# Patient Record
Sex: FEMALE | Race: WHITE | NOT HISPANIC OR LATINO | Employment: OTHER | URBAN - METROPOLITAN AREA
[De-identification: names, ages, dates, MRNs, and addresses within clinical notes are randomized per-mention and may not be internally consistent; named-entity substitution may affect disease eponyms.]

---

## 2018-05-25 ENCOUNTER — HOSPITAL ENCOUNTER (EMERGENCY)
Facility: HOSPITAL | Age: 28
Discharge: HOME | End: 2018-05-25
Attending: EMERGENCY MEDICINE
Payer: COMMERCIAL

## 2018-05-25 VITALS
SYSTOLIC BLOOD PRESSURE: 129 MMHG | HEIGHT: 63 IN | DIASTOLIC BLOOD PRESSURE: 79 MMHG | WEIGHT: 130 LBS | HEART RATE: 56 BPM | OXYGEN SATURATION: 100 % | RESPIRATION RATE: 18 BRPM | TEMPERATURE: 98 F | BODY MASS INDEX: 23.04 KG/M2

## 2018-05-25 DIAGNOSIS — R10.2 PELVIC PAIN: Primary | ICD-10-CM

## 2018-05-25 LAB
ALBUMIN SERPL BCP-MCNC: 3.8 G/DL
ALP SERPL-CCNC: 24 U/L
ALT SERPL W/O P-5'-P-CCNC: 17 U/L
ANION GAP SERPL CALC-SCNC: 7 MMOL/L
AST SERPL-CCNC: 21 U/L
B-HCG UR QL: NEGATIVE
BACTERIA GENITAL QL WET PREP: ABNORMAL
BASOPHILS # BLD AUTO: 0.02 K/UL
BASOPHILS NFR BLD: 0.4 %
BILIRUB SERPL-MCNC: 0.4 MG/DL
BILIRUB UR QL STRIP: NEGATIVE
BUN SERPL-MCNC: 11 MG/DL
CALCIUM SERPL-MCNC: 9.4 MG/DL
CHLORIDE SERPL-SCNC: 104 MMOL/L
CLARITY UR: CLEAR
CLUE CELLS VAG QL WET PREP: ABNORMAL
CO2 SERPL-SCNC: 26 MMOL/L
COLOR UR: YELLOW
CREAT SERPL-MCNC: 0.9 MG/DL
CTP QC/QA: YES
DIFFERENTIAL METHOD: ABNORMAL
EOSINOPHIL # BLD AUTO: 0.2 K/UL
EOSINOPHIL NFR BLD: 2.9 %
ERYTHROCYTE [DISTWIDTH] IN BLOOD BY AUTOMATED COUNT: 12.9 %
EST. GFR  (AFRICAN AMERICAN): >60 ML/MIN/1.73 M^2
EST. GFR  (NON AFRICAN AMERICAN): >60 ML/MIN/1.73 M^2
FILAMENT FUNGI VAG WET PREP-#/AREA: ABNORMAL
GLUCOSE SERPL-MCNC: 110 MG/DL
GLUCOSE UR QL STRIP: NEGATIVE
HCT VFR BLD AUTO: 35.9 %
HGB BLD-MCNC: 11.8 G/DL
HGB UR QL STRIP: NEGATIVE
KETONES UR QL STRIP: NEGATIVE
LEUKOCYTE ESTERASE UR QL STRIP: NEGATIVE
LIPASE SERPL-CCNC: 25 U/L
LYMPHOCYTES # BLD AUTO: 2.6 K/UL
LYMPHOCYTES NFR BLD: 49.8 %
MCH RBC QN AUTO: 29.9 PG
MCHC RBC AUTO-ENTMCNC: 32.9 G/DL
MCV RBC AUTO: 91 FL
MONOCYTES # BLD AUTO: 0.5 K/UL
MONOCYTES NFR BLD: 9 %
NEUTROPHILS # BLD AUTO: 2 K/UL
NEUTROPHILS NFR BLD: 37.7 %
NITRITE UR QL STRIP: NEGATIVE
PH UR STRIP: 7 [PH] (ref 5–8)
PLATELET # BLD AUTO: 282 K/UL
PMV BLD AUTO: 9.8 FL
POTASSIUM SERPL-SCNC: 3.9 MMOL/L
PROT SERPL-MCNC: 7.2 G/DL
PROT UR QL STRIP: NEGATIVE
RBC # BLD AUTO: 3.95 M/UL
SODIUM SERPL-SCNC: 137 MMOL/L
SP GR UR STRIP: 1.02 (ref 1–1.03)
SPECIMEN SOURCE: ABNORMAL
T VAGINALIS GENITAL QL WET PREP: ABNORMAL
URN SPEC COLLECT METH UR: NORMAL
UROBILINOGEN UR STRIP-ACNC: NEGATIVE EU/DL
WBC # BLD AUTO: 5.22 K/UL
WBC #/AREA VAG WET PREP: ABNORMAL
YEAST GENITAL QL WET PREP: ABNORMAL

## 2018-05-25 PROCEDURE — 63600175 PHARM REV CODE 636 W HCPCS: Performed by: PHYSICIAN ASSISTANT

## 2018-05-25 PROCEDURE — 81025 URINE PREGNANCY TEST: CPT | Performed by: NURSE PRACTITIONER

## 2018-05-25 PROCEDURE — 25500020 PHARM REV CODE 255: Performed by: EMERGENCY MEDICINE

## 2018-05-25 PROCEDURE — 81003 URINALYSIS AUTO W/O SCOPE: CPT

## 2018-05-25 PROCEDURE — 87210 SMEAR WET MOUNT SALINE/INK: CPT

## 2018-05-25 PROCEDURE — 85025 COMPLETE CBC W/AUTO DIFF WBC: CPT

## 2018-05-25 PROCEDURE — 25000003 PHARM REV CODE 250

## 2018-05-25 PROCEDURE — 83690 ASSAY OF LIPASE: CPT

## 2018-05-25 PROCEDURE — 96372 THER/PROPH/DIAG INJ SC/IM: CPT

## 2018-05-25 PROCEDURE — 25000003 PHARM REV CODE 250: Performed by: PHYSICIAN ASSISTANT

## 2018-05-25 PROCEDURE — 80053 COMPREHEN METABOLIC PANEL: CPT

## 2018-05-25 PROCEDURE — 99284 EMERGENCY DEPT VISIT MOD MDM: CPT | Mod: 25

## 2018-05-25 PROCEDURE — 96360 HYDRATION IV INFUSION INIT: CPT

## 2018-05-25 PROCEDURE — 87491 CHLMYD TRACH DNA AMP PROBE: CPT

## 2018-05-25 PROCEDURE — 25000003 PHARM REV CODE 250: Performed by: NURSE PRACTITIONER

## 2018-05-25 RX ORDER — KETOROLAC TROMETHAMINE 10 MG/1
10 TABLET, FILM COATED ORAL
Status: COMPLETED | OUTPATIENT
Start: 2018-05-25 | End: 2018-05-25

## 2018-05-25 RX ORDER — MORPHINE SULFATE 4 MG/ML
4 INJECTION, SOLUTION INTRAMUSCULAR; INTRAVENOUS
Status: DISCONTINUED | OUTPATIENT
Start: 2018-05-25 | End: 2018-05-25 | Stop reason: HOSPADM

## 2018-05-25 RX ORDER — AZITHROMYCIN 250 MG/1
1000 TABLET, FILM COATED ORAL
Status: COMPLETED | OUTPATIENT
Start: 2018-05-25 | End: 2018-05-25

## 2018-05-25 RX ORDER — ONDANSETRON 4 MG/1
4 TABLET, ORALLY DISINTEGRATING ORAL
Status: COMPLETED | OUTPATIENT
Start: 2018-05-25 | End: 2018-05-25

## 2018-05-25 RX ORDER — CEFTRIAXONE 250 MG/1
250 INJECTION, POWDER, FOR SOLUTION INTRAMUSCULAR; INTRAVENOUS
Status: COMPLETED | OUTPATIENT
Start: 2018-05-25 | End: 2018-05-25

## 2018-05-25 RX ORDER — LIDOCAINE HYDROCHLORIDE 10 MG/ML
INJECTION INFILTRATION; PERINEURAL
Status: COMPLETED
Start: 2018-05-25 | End: 2018-05-25

## 2018-05-25 RX ORDER — NAPROXEN 500 MG/1
500 TABLET ORAL 2 TIMES DAILY WITH MEALS
Qty: 14 TABLET | Refills: 0 | Status: SHIPPED | OUTPATIENT
Start: 2018-05-25 | End: 2018-08-21

## 2018-05-25 RX ADMIN — SODIUM CHLORIDE 1000 ML: 0.9 INJECTION, SOLUTION INTRAVENOUS at 06:05

## 2018-05-25 RX ADMIN — LIDOCAINE HYDROCHLORIDE 200 MG: 10 INJECTION, SOLUTION INFILTRATION; PERINEURAL at 09:05

## 2018-05-25 RX ADMIN — KETOROLAC TROMETHAMINE 10 MG: 10 TABLET, FILM COATED ORAL at 09:05

## 2018-05-25 RX ADMIN — ONDANSETRON 4 MG: 4 TABLET, ORALLY DISINTEGRATING ORAL at 06:05

## 2018-05-25 RX ADMIN — IOHEXOL 75 ML: 350 INJECTION, SOLUTION INTRAVENOUS at 06:05

## 2018-05-25 RX ADMIN — AZITHROMYCIN 1000 MG: 250 TABLET, FILM COATED ORAL at 09:05

## 2018-05-25 RX ADMIN — CEFTRIAXONE SODIUM 250 MG: 250 INJECTION, POWDER, FOR SOLUTION INTRAMUSCULAR; INTRAVENOUS at 09:05

## 2018-05-25 NOTE — ED PROVIDER NOTES
"Encounter Date: 5/25/2018       History     Chief Complaint   Patient presents with    Abdominal Pain     Pt started having lower abdominal pain x 3 days ago, +nausea and vomiting on 1st day, denies vaginal bleeding or discharge. LBM today and was painful to bear down.      26yo female, previously healthy, presents to the ED for abd pain.  Pt has had lower abd pain which has progressively worsened over the past three days.  No fever/chills, CP, SOB, urinary symptoms, vaginal bleeding, or discharge.  Pt has had nausea and one episode of nonbloody diarrhea today.  Pt states the pain is worsened by movement, certain positions, and BMs.  She has had decreased appetite but has been able to tolerate Po fluids.  Pain is constant, "pressure."  No history of ovarian cysts.  No history of abd surgery.  No other complaints at this time.        The history is provided by the patient.   Abdominal Pain   The current episode started several days ago. The onset of the illness was gradual. The problem has been gradually worsening. The abdominal pain is located in the suprapubic region. The abdominal pain does not radiate. The severity of the abdominal pain is 8/10. The abdominal pain is relieved by nothing. The abdominal pain is exacerbated by certain positions, movement and bowel movements. The other symptoms of the illness include nausea and diarrhea. The other symptoms of the illness do not include fever, vomiting, dysuria, hematemesis, hematochezia, vaginal discharge or vaginal bleeding.   The diarrhea began today. The diarrhea is watery. Daily occurrences: once.   Nausea began yesterday. The nausea is associated with eating. The nausea is exacerbated by food.   The patient states that she believes she is currently not pregnant. The patient has had a change in bowel habit. Additional symptoms associated with the illness include anorexia. Symptoms associated with the illness do not include chills, diaphoresis, heartburn, " constipation, urgency, hematuria, frequency or back pain. Significant associated medical issues do not include GERD or inflammatory bowel disease.     Review of patient's allergies indicates:  No Known Allergies  History reviewed. No pertinent past medical history.  History reviewed. No pertinent surgical history.  History reviewed. No pertinent family history.  Social History   Substance Use Topics    Smoking status: Light Tobacco Smoker    Smokeless tobacco: Never Used    Alcohol use Yes     Review of Systems   Constitutional: Positive for activity change and appetite change. Negative for chills, diaphoresis and fever.   HENT: Negative for congestion.    Respiratory: Negative for cough.    Cardiovascular: Negative for chest pain.   Gastrointestinal: Positive for abdominal pain, anorexia, diarrhea and nausea. Negative for blood in stool, constipation, heartburn, hematemesis, hematochezia and vomiting.   Genitourinary: Negative for dysuria, frequency, hematuria, urgency, vaginal bleeding and vaginal discharge.   Musculoskeletal: Negative for back pain.   Skin: Negative for rash.   Allergic/Immunologic: Negative for immunocompromised state.   Neurological: Negative for weakness, light-headedness and headaches.   Psychiatric/Behavioral: Negative for confusion.       Physical Exam     Initial Vitals [05/25/18 1721]   BP Pulse Resp Temp SpO2   121/78 60 20 98.3 °F (36.8 °C) 97 %      MAP       92.33         Physical Exam    Nursing note and vitals reviewed.  Constitutional: Vital signs are normal. She appears well-developed and well-nourished. She is active and cooperative. She is easily aroused.  Non-toxic appearance. She does not have a sickly appearance. She does not appear ill. No distress.   Pt appears uncomfortable.    HENT:   Head: Normocephalic and atraumatic.   Mouth/Throat: Uvula is midline, oropharynx is clear and moist and mucous membranes are normal.   Eyes: Conjunctivae and EOM are normal.   Neck:  Normal range of motion. Neck supple.   Cardiovascular: Normal rate, regular rhythm and normal heart sounds.   Pulmonary/Chest: Effort normal and breath sounds normal.   Abdominal: Soft. Normal appearance and bowel sounds are normal. She exhibits no distension. There is tenderness in the right lower quadrant, epigastric area and suprapubic area. There is rebound. There is no rigidity, no guarding and no CVA tenderness.   Genitourinary: Pelvic exam was performed with patient supine. Cervix exhibits discharge (white homogenous discharge ). Cervix exhibits no motion tenderness and no friability. There is tenderness in the vagina. Vaginal discharge (white homogenous discharge ) found.   Neurological: She is alert, oriented to person, place, and time and easily aroused. She has normal strength. GCS eye subscore is 4. GCS verbal subscore is 5. GCS motor subscore is 6.   Skin: Skin is warm, dry and intact. Capillary refill takes less than 2 seconds. No bruising and no rash noted. No erythema.   Psychiatric: She has a normal mood and affect. Her speech is normal and behavior is normal. Judgment and thought content normal. Cognition and memory are normal.         ED Course   Procedures  Labs Reviewed   CBC W/ AUTO DIFFERENTIAL - Abnormal; Notable for the following:        Result Value    RBC 3.95 (*)     Hemoglobin 11.8 (*)     Hematocrit 35.9 (*)     Gran% 37.7 (*)     Lymph% 49.8 (*)     All other components within normal limits   COMPREHENSIVE METABOLIC PANEL - Abnormal; Notable for the following:     Alkaline Phosphatase 24 (*)     Anion Gap 7 (*)     All other components within normal limits   URINALYSIS   LIPASE   POCT URINE PREGNANCY         Imaging Results          CT Abdomen Pelvis With Contrast (Final result)     Abnormal  Result time 05/25/18 19:19:32    Final result by Toni Dorantes MD (05/25/18 19:19:32)                 Impression:      Suspected diffuse nonspecific periportal edema.  No biliary ductal  dilatation.  Correlation with LFTs recommended.    Partially contracted appearing gallbladder with circumferential wall enhancement and questionable wall thickening versus pericholecystic fluid, which could indicate acute cholecystitis in the proper clinical setting.  Further evaluation with right upper quadrant ultrasound can be obtained as warranted.    Prominence of the uterine fundus with heterogeneous enhancement which could reflect underlying fibroids.  Further evaluation with elective/nonemergent pelvic ultrasound can be obtained as warranted.    Few additional findings as above.    This report was flagged in Epic as abnormal.      Electronically signed by: Toni Dorantes MD  Date:    05/25/2018  Time:    19:19             Narrative:    EXAMINATION:  CT ABDOMEN PELVIS WITH CONTRAST    CLINICAL HISTORY:  Abdominal pain, unspecified;    TECHNIQUE:  Low dose axial images, sagittal and coronal reformations were obtained from the lung bases to the pubic symphysis following the IV administration of 75 mL of Omnipaque 350 contrast.  No oral contrast.    COMPARISON:  None.    FINDINGS:  Included lung bases are clear.  Base of the heart is within normal limits.    Liver is normal in size with suspected diffuse nonspecific periportal edema, but no focal parenchymal process seen.  Gallbladder appears partially collapsed with circumferential wall enhancement and questionable wall thickening versus pericholecystic fluid.  No biliary ductal dilatation.  Pancreas, spleen, stomach, duodenum and bilateral adrenal glands are within normal limits.    Bilateral kidneys are normal in size, shape and location with symmetric enhancement.  No hydronephrosis or significant perinephric stranding.  Bilateral ureters are within normal limits.  Urinary bladder is within normal limits.  Uterus is retroflexed with areas of heterogeneous enhancement most prominent at the fundic region as well as somewhat overall prominent appearance which  could reflect underlying fibroids.  No adnexal mass seen.  There is small volume nonspecific free pelvic fluid.  Pelvic phleboliths noted.    No ascites, free air or lymphadenopathy.  Aorta is within normal limits.  Retroaortic left renal vein incidentally noted.    The appendix and terminal ileum are within normal limits.  No evidence of bowel obstruction or inflammation.  No pneumatosis or portal venous gas.    Included osseous structures show chronic appearing mild anterior wedge deformity of T11 and T12 vertebral bodies with mild degenerative change at T11-12 level, without acute or destructive process seen.                                       Medical Decision Making:   Initial Assessment:   27-year-old previously healthy female here for lower abdominal pain for 3 days.  The pain is constant, and gradually getting worse.  She had one episode of nonbloody diarrhea today.  The patient appears well, nontoxic.  Vitals stable.  There is tenderness in the suprapubic and right lower quadrant area.  She denies vomiting, urinary symptoms, vaginal bleeding, and discharge.  There is rebound tenderness.  No rash or signs of trauma.  Differential Diagnosis:   GERD, intestinal spasm, gastroenteritis, gastritis, ulcer, cholecystitis, gallstones, pancreatitis, ileus, small bowel obstruction, appendicitis, constipation, intestinal gas pain, UTI, ovarian cyst, pregnancy.    Clinical Tests:   Lab Tests: Ordered and Reviewed  Radiological Study: Ordered and Reviewed  ED Management:  Labs, IV fluids, Zofran ODT, CT abd/pelvis    Pt declined IV morphine    By mouth Toradol given.  White, homogenous discharge noted on  exam.  Patient was instructed that this is concerning for possible STD.  She will be prophylactically treated with azithromycin and Rocephin.  She was instructed that we would contact her with any positive test result.  Patient was instructed to follow-up with her PCP for further evaluation.  UPT negative. UA  negative for infection.  WBC normal.  Normal renal function.       Pt was turned over to  at 1900 for disposition.                            Clinical Impression:   The encounter diagnosis was Pelvic pain.                           Minerva Shaikh PA-C  05/26/18 0011

## 2018-05-27 LAB
C TRACH DNA SPEC QL NAA+PROBE: NOT DETECTED
N GONORRHOEA DNA SPEC QL NAA+PROBE: NOT DETECTED

## 2018-08-21 ENCOUNTER — OFFICE VISIT (OUTPATIENT)
Dept: SURGERY | Facility: CLINIC | Age: 28
End: 2018-08-21
Payer: COMMERCIAL

## 2018-08-21 ENCOUNTER — HOSPITAL ENCOUNTER (OUTPATIENT)
Dept: RADIOLOGY | Facility: HOSPITAL | Age: 28
Discharge: HOME | End: 2018-08-21
Attending: SURGERY
Payer: COMMERCIAL

## 2018-08-21 VITALS
BODY MASS INDEX: 23.05 KG/M2 | HEART RATE: 56 BPM | SYSTOLIC BLOOD PRESSURE: 111 MMHG | DIASTOLIC BLOOD PRESSURE: 68 MMHG | HEIGHT: 64 IN | TEMPERATURE: 98 F | WEIGHT: 135 LBS

## 2018-08-21 DIAGNOSIS — R10.84 GENERALIZED ABDOMINAL PAIN: ICD-10-CM

## 2018-08-21 PROCEDURE — 3008F BODY MASS INDEX DOCD: CPT | Mod: CPTII,S$GLB,, | Performed by: SURGERY

## 2018-08-21 PROCEDURE — 99999 PR PBB SHADOW E&M-EST. PATIENT-LVL III: CPT | Mod: PBBFAC,,, | Performed by: SURGERY

## 2018-08-21 PROCEDURE — 76700 US EXAM ABDOM COMPLETE: CPT | Mod: 26,,, | Performed by: RADIOLOGY

## 2018-08-21 PROCEDURE — 99203 OFFICE O/P NEW LOW 30 MIN: CPT | Mod: S$GLB,,, | Performed by: SURGERY

## 2018-08-21 PROCEDURE — 76700 US EXAM ABDOM COMPLETE: CPT | Mod: TC

## 2018-08-21 RX ORDER — NORETHINDRONE ACETATE AND ETHINYL ESTRADIOL 1.5; 3 MG/1; UG/1
TABLET ORAL
COMMUNITY
Start: 2018-08-18

## 2018-08-21 NOTE — PROGRESS NOTES
[facility] - General [facility]  General Surgery  History & Physical    Patient Name: Mylene Yoon  MRN: [id number]  Attending Physician: Darrell Fitzgerald MD  Primary Care Provider: Tima Escobar MD    Patient information was obtained from patient, past medical records and ER records.     Subjective:     Chief Complaint/Reason for Admission: abdominal pain    History of Present Illness:  Patient is a 27 y.o. female presents with generalized abdominal pain with nausea, sometimes vomiting after eating greasy foods. This has been going on for many (5+) years. Pain lasts 1-3 hrs. Patient was seen in ED in May 2018 for abdominal pain; per patient, pain has been diagnoses as likely ruptured ovarian cyst. However, CT in the ED at the time revealed possible gallbladder wall thickening vs pericholecystic fluid. Patient has been referred to us by her OB/GYN for evaluation of gallbladder; patient is trying to get pregnant, and her OB hopes to avoid pregnancy-related complications related to gall-bladder.     Current Outpatient Medications on File Prior to Visit   Medication Sig    MICROGESTIN 1.5/30, 21, 1.5-30 mg-mcg Tab     NON FORMULARY Birth Control pills.    [DISCONTINUED] naproxen (NAPROSYN) 500 MG tablet Take 1 tablet (500 mg total) by mouth 2 (two) times daily with meals.    [DISCONTINUED] ondansetron (ZOFRAN-ODT) 4 MG TbDL Take 1 tablet (4 mg total) by mouth every 12 (twelve) hours as needed. As needed for nausea.    [DISCONTINUED] promethazine (PHENERGAN) 25 MG suppository Place 1 suppository (25 mg total) rectally every 6 (six) hours as needed for Nausea.     No current facility-administered medications on file prior to visit.        Review of patient's allergies indicates:  No Known Allergies    History reviewed. No pertinent past medical history.  History reviewed. No pertinent surgical history.  Family History     Problem Relation (Age of Onset)    Depression Mother    Hyperlipidemia Sister        Tobacco  Use    Smoking status: Light Tobacco Smoker    Smokeless tobacco: Never Used   Substance and Sexual Activity    Alcohol use: Yes    Drug use: No    Sexual activity: Yes     Partners: Male     Review of Systems   Constitutional: Negative for fever.   Respiratory: Negative for shortness of breath.    Cardiovascular: Negative for chest pain and palpitations.   Gastrointestinal: Positive for abdominal pain, nausea and vomiting. Negative for abdominal distention, constipation and diarrhea.   Skin: Negative for rash.     Objective:     Vital Signs (Most Recent):  Temp: 98.3 °F (36.8 °C) (08/21/18 0713)  Pulse: (!) 56 (08/21/18 0713)  BP: 111/68 (08/21/18 0713) Vital Signs (24h Range):  [unfilled]     Weight: 61.2 kg (135 lb)  Body mass index is 23.17 kg/m².    Physical Exam   NAD, AAOX3  Conjunctivae clear, sclerae nonicteric  CTAB, no labored breathing  RRR S1S2 no m/r/g  Abdomen soft, ND, nontender; Morgan's sign negative; normal active bowel sounds  No c/c/e of extremities    Significant Labs:  CBC:   CMP:   LFTs:     Significant Diagnostics:  I have reviewed and interpreted all pertinent imaging results/findings within the past 24 hours.    Assessment/Plan:     Abdominal pain  - possible gallbladder etiology; CT from 05/25/18 shows possible gallbladder wall thickening vs pericholecystic fluid  - will perform RUQ abdominal ultrasound today (patient is fasting)  - pending results, consider LONNY Edwards MD  General Surgery  [facility] - General Surgery

## 2018-08-21 NOTE — PROGRESS NOTES
I have seen the patient, reviewed the Resident's history and physical, assessment and plan. I have personally interviewed and examined the patient at bedside and: agree with the findings.     She has had nausea, occasional vomiting and epigastric abdominal pain after fatty meals (about 30 minutes) and radiating throughout her abdomen.  Will obtain u/s.

## 2018-08-21 NOTE — LETTER
August 21, 2018        Tima Escobar MD  [address]   [address]  [facility] [address]     [facility] - General Surgery  [address]  [place] [address]  Phone: [phone]   Patient: Mylene Yoon   MR Number: [id number]   YOB: 1990   Date of Visit: 8/21/2018     Dear Dr. Escobar:    Thank you for referring Mylene Yoon to me for evaluation. Attached you will find relevant portions of my assessment and plan of care.    Patient has had nausea, occasional vomiting and epigastric abdominal pain after fatty meals (about 30 minutes) and radiating throughout her abdomen.  Will obtain ultrasound.    If you have questions, please do not hesitate to call me. I look forward to following Mylene Yoon along with you.    Sincerely,      Darrell Fitzgerald MD  Section Head - General, Laparoscopic, Bariatric  Acute Care and Oncologic Surgery   - Surgical Weight Loss Program  [facility]    WSR/magri    CC  Shae Low MD    [facility]

## 2018-08-23 ENCOUNTER — TELEPHONE (OUTPATIENT)
Dept: CARDIOTHORACIC SURGERY | Facility: CLINIC | Age: 28
End: 2018-08-23

## 2018-08-23 DIAGNOSIS — R10.84 GENERALIZED ABDOMINAL PAIN: Primary | ICD-10-CM

## 2018-08-23 NOTE — TELEPHONE ENCOUNTER
Returned call to pt.  Informed her that Dr. Fitzgerald reviewed her abdominal US performed on 8-21-18 and would like her to have an EGD.  Pt informed that the order has been placed for the EGD and I gave her the phone number to call and schedule the EGD.  Pt verbalized understanding.    ----- Message from Erendira Tuttle sent at 8/23/2018  9:51 AM CDT -----  Pt called stating she need to speak with nurse regarding test results.    Please call [phone] regarding this.    Thanks

## 2018-08-31 ENCOUNTER — OFFICE VISIT (OUTPATIENT)
Dept: SURGERY | Facility: CLINIC | Age: 28
End: 2018-08-31
Payer: COMMERCIAL

## 2018-08-31 ENCOUNTER — TELEPHONE (OUTPATIENT)
Dept: SURGERY | Facility: CLINIC | Age: 28
End: 2018-08-31

## 2018-08-31 VITALS
DIASTOLIC BLOOD PRESSURE: 68 MMHG | HEART RATE: 62 BPM | BODY MASS INDEX: 23.49 KG/M2 | SYSTOLIC BLOOD PRESSURE: 122 MMHG | WEIGHT: 137.56 LBS | HEIGHT: 64 IN

## 2018-08-31 DIAGNOSIS — K64.5 PERIANAL VENOUS THROMBOSIS: Primary | ICD-10-CM

## 2018-08-31 PROCEDURE — 99999 PR PBB SHADOW E&M-EST. PATIENT-LVL III: CPT | Mod: PBBFAC,,, | Performed by: COLON & RECTAL SURGERY

## 2018-08-31 PROCEDURE — 3008F BODY MASS INDEX DOCD: CPT | Mod: CPTII,S$GLB,, | Performed by: COLON & RECTAL SURGERY

## 2018-08-31 PROCEDURE — 99203 OFFICE O/P NEW LOW 30 MIN: CPT | Mod: 25,S$GLB,, | Performed by: COLON & RECTAL SURGERY

## 2018-08-31 PROCEDURE — 46320 REMOVAL OF HEMORRHOID CLOT: CPT | Mod: S$GLB,,, | Performed by: COLON & RECTAL SURGERY

## 2018-08-31 RX ORDER — TRAMADOL HYDROCHLORIDE 50 MG/1
50 TABLET ORAL EVERY 6 HOURS PRN
Qty: 15 TABLET | Refills: 0 | Status: SHIPPED | OUTPATIENT
Start: 2018-08-31 | End: 2018-09-10

## 2018-08-31 NOTE — PROGRESS NOTES
CRS Office Visit History and Physical    Referring Md:   Aaareferral Self  No address on file    SUBJECTIVE:     Chief Complaint: hemorrhoids    History of Present Illness:  Patient is a 27 y.o. female presents with hemorrhoids. The patient is a new patient to this practice.   Course is as follows:  27-year-old woman who developed hemorrhoids following the birth of her son 5 years ago presents urgently for evaluation for a 1 day history of perianal pain and swelling. She denies constipation.  She has 1-2 bowel movements per day and spends less than 2 min on the toilet for each bowel movement.  She denies any bleeding. Her hemorrhoid over the past 5 years has intermittently increased in size and decreased in size but never caused her significant pain. No family history of colon or rectal cancer.  No prior anorectal surgeries before.    Last Colonoscopy: none    Review of patient's allergies indicates:  No Known Allergies    History reviewed. No pertinent past medical history.  History reviewed. No pertinent surgical history.  Family History   Problem Relation Age of Onset    Hyperlipidemia Sister     Depression Mother      Social History     Tobacco Use    Smoking status: Light Tobacco Smoker    Smokeless tobacco: Never Used   Substance Use Topics    Alcohol use: Yes    Drug use: No        Review of Systems:  Review of Systems   Constitutional: Negative for chills, diaphoresis, fever, malaise/fatigue and weight loss.   HENT: Negative for congestion.    Respiratory: Negative for shortness of breath.    Cardiovascular: Negative for chest pain and leg swelling.   Gastrointestinal: Negative for abdominal pain, blood in stool, constipation, nausea and vomiting.   Genitourinary: Negative for dysuria.   Musculoskeletal: Negative for back pain and myalgias.   Skin: Negative for rash.   Neurological: Negative for dizziness and weakness.   Endo/Heme/Allergies: Does not bruise/bleed easily.   Psychiatric/Behavioral:  "Negative for depression.       OBJECTIVE:     Vital Signs (Most Recent)  /68 (BP Location: Left arm, Patient Position: Sitting, BP Method: Large (Automatic))   Pulse 62   Ht 5' 4" (1.626 m)   Wt 62.4 kg (137 lb 9.1 oz)   BMI 23.61 kg/m²     Physical Exam:  General: White female in no distress   Neuro: alert and oriented x 4.  Moves all extremities.     HEENT: no icterus.  Trachea midline  Respiratory: respirations are even and unlabored  Cardiac: regular rate  Abdomen:  Soft, nontender, no masses  Extremities: Warm dry and intact  Skin: no rashes  Anorectal:  Thrombosed external hemorrhoid seen in the left anterior position    Labs: H/h = 12/36    Imaging:  None      ASSESSMENT/PLAN:     Diagnoses and all orders for this visit:    Perianal venous thrombosis    Other orders  -     traMADol (ULTRAM) 50 mg tablet; Take 1 tablet (50 mg total) by mouth every 6 (six) hours as needed for Pain.        27-year-old lady with thrombosed external hemorrhoid.  This has been present for 1 day.  Therefore excision was recommended and performed without complication.  She can follow up with me as needed    Excision of thrombosed external hemorrhoid:  In the prone jack-knife position, the perianal skin was prepped with Betadine.  5cc of lidocaine were injected as the local anesthetic.  An elliptical incision was made over the apex of the hemorrhoid.  The underlying clot was were evacuated.  Hemostasis was achieved with silver nitrate.  She tolerated the procedure well. There were no complications.    LENNOX Meza MD  Staff Surgeon  Colon & Rectal Surgery    "

## 2018-08-31 NOTE — TELEPHONE ENCOUNTER
----- Message from Macy Plasencia sent at 8/31/2018  1:56 PM CDT -----  Contact: self [phone]  Pt called stating she would like to see Dr. Blue today. Rayray Brito referred her to see Dr. Blue.. She states she is having a lot of hemorrhoids pain... She states she is going out of town tomorrow, and she would like some medicine relief    Contact: self [phone]

## 2018-09-30 ENCOUNTER — ANESTHESIA EVENT (OUTPATIENT)
Dept: ENDOSCOPY | Facility: HOSPITAL | Age: 28
End: 2018-09-30
Payer: COMMERCIAL

## 2018-10-01 ENCOUNTER — HOSPITAL ENCOUNTER (OUTPATIENT)
Facility: HOSPITAL | Age: 28
Discharge: HOME | End: 2018-10-01
Attending: INTERNAL MEDICINE | Admitting: INTERNAL MEDICINE
Payer: COMMERCIAL

## 2018-10-01 ENCOUNTER — ANESTHESIA (OUTPATIENT)
Dept: ENDOSCOPY | Facility: HOSPITAL | Age: 28
End: 2018-10-01
Payer: COMMERCIAL

## 2018-10-01 ENCOUNTER — TELEPHONE (OUTPATIENT)
Dept: GASTROENTEROLOGY | Facility: CLINIC | Age: 28
End: 2018-10-01

## 2018-10-01 VITALS
HEIGHT: 63 IN | DIASTOLIC BLOOD PRESSURE: 72 MMHG | OXYGEN SATURATION: 98 % | TEMPERATURE: 99 F | RESPIRATION RATE: 12 BRPM | WEIGHT: 135 LBS | BODY MASS INDEX: 23.92 KG/M2 | SYSTOLIC BLOOD PRESSURE: 106 MMHG | HEART RATE: 72 BPM

## 2018-10-01 DIAGNOSIS — R10.9 ABDOMINAL PAIN: ICD-10-CM

## 2018-10-01 DIAGNOSIS — R10.84 GENERALIZED ABDOMINAL PAIN: Primary | ICD-10-CM

## 2018-10-01 PROCEDURE — 63600175 PHARM REV CODE 636 W HCPCS: Performed by: NURSE ANESTHETIST, CERTIFIED REGISTERED

## 2018-10-01 PROCEDURE — 25000003 PHARM REV CODE 250: Performed by: NURSE ANESTHETIST, CERTIFIED REGISTERED

## 2018-10-01 PROCEDURE — 88305 TISSUE EXAM BY PATHOLOGIST: CPT | Performed by: PATHOLOGY

## 2018-10-01 PROCEDURE — 88342 IMHCHEM/IMCYTCHM 1ST ANTB: CPT | Mod: 26,,, | Performed by: PATHOLOGY

## 2018-10-01 PROCEDURE — 43239 EGD BIOPSY SINGLE/MULTIPLE: CPT | Mod: ,,, | Performed by: INTERNAL MEDICINE

## 2018-10-01 PROCEDURE — 43239 EGD BIOPSY SINGLE/MULTIPLE: CPT | Performed by: INTERNAL MEDICINE

## 2018-10-01 PROCEDURE — 37000008 HC ANESTHESIA 1ST 15 MINUTES: Performed by: INTERNAL MEDICINE

## 2018-10-01 PROCEDURE — 27201012 HC FORCEPS, HOT/COLD, DISP: Performed by: INTERNAL MEDICINE

## 2018-10-01 PROCEDURE — E9220 PRA ENDO ANESTHESIA: HCPCS | Mod: ,,, | Performed by: NURSE ANESTHETIST, CERTIFIED REGISTERED

## 2018-10-01 PROCEDURE — 88305 TISSUE EXAM BY PATHOLOGIST: CPT | Mod: 26,,, | Performed by: PATHOLOGY

## 2018-10-01 PROCEDURE — 88342 IMHCHEM/IMCYTCHM 1ST ANTB: CPT | Performed by: PATHOLOGY

## 2018-10-01 PROCEDURE — 37000009 HC ANESTHESIA EA ADD 15 MINS: Performed by: INTERNAL MEDICINE

## 2018-10-01 PROCEDURE — 25000003 PHARM REV CODE 250: Performed by: INTERNAL MEDICINE

## 2018-10-01 RX ORDER — PROPOFOL 10 MG/ML
VIAL (ML) INTRAVENOUS CONTINUOUS PRN
Status: DISCONTINUED | OUTPATIENT
Start: 2018-10-01 | End: 2018-10-01

## 2018-10-01 RX ORDER — SODIUM CHLORIDE 9 MG/ML
INJECTION, SOLUTION INTRAVENOUS CONTINUOUS
Status: DISCONTINUED | OUTPATIENT
Start: 2018-10-01 | End: 2018-10-01 | Stop reason: HOSPADM

## 2018-10-01 RX ORDER — SODIUM CHLORIDE 0.9 % (FLUSH) 0.9 %
3 SYRINGE (ML) INJECTION
Status: DISCONTINUED | OUTPATIENT
Start: 2018-10-01 | End: 2018-10-01 | Stop reason: HOSPADM

## 2018-10-01 RX ORDER — PROPOFOL 10 MG/ML
VIAL (ML) INTRAVENOUS
Status: DISCONTINUED | OUTPATIENT
Start: 2018-10-01 | End: 2018-10-01

## 2018-10-01 RX ORDER — LIDOCAINE HYDROCHLORIDE 10 MG/ML
INJECTION, SOLUTION INTRAVENOUS
Status: DISCONTINUED | OUTPATIENT
Start: 2018-10-01 | End: 2018-10-01

## 2018-10-01 RX ORDER — GLYCOPYRROLATE 0.2 MG/ML
INJECTION INTRAMUSCULAR; INTRAVENOUS
Status: DISCONTINUED | OUTPATIENT
Start: 2018-10-01 | End: 2018-10-01

## 2018-10-01 RX ADMIN — PROPOFOL 30 MG: 10 INJECTION, EMULSION INTRAVENOUS at 03:10

## 2018-10-01 RX ADMIN — GLYCOPYRROLATE 0.2 MG: 0.2 INJECTION, SOLUTION INTRAMUSCULAR; INTRAVENOUS at 03:10

## 2018-10-01 RX ADMIN — LIDOCAINE HYDROCHLORIDE 50 MG: 10 INJECTION, SOLUTION INTRAVENOUS at 03:10

## 2018-10-01 RX ADMIN — SODIUM CHLORIDE: 0.9 INJECTION, SOLUTION INTRAVENOUS at 02:10

## 2018-10-01 RX ADMIN — PROPOFOL 70 MG: 10 INJECTION, EMULSION INTRAVENOUS at 03:10

## 2018-10-01 RX ADMIN — PROPOFOL 250 MCG/KG/MIN: 10 INJECTION, EMULSION INTRAVENOUS at 03:10

## 2018-10-01 NOTE — DISCHARGE INSTRUCTIONS

## 2018-10-01 NOTE — [employer] PATIENT INSTRUCTIONS
Discharge Summary/Instructions after an Endoscopic Procedure  Patient Name: Mylene Yoon  Patient MRN: [id number]  Patient YOB: 1990 Monday, October 01, 2018  Haritha Avila MD  RESTRICTIONS:  During your procedure today, you received medications for sedation.  These   medications may affect your judgment, balance and coordination.  Therefore,   for 24 hours, you have the following restrictions:   - DO NOT drive a car, operate machinery, make legal/financial decisions,   sign important papers or drink alcohol.    ACTIVITY:  Today: no heavy lifting, straining or running due to procedural   sedation/anesthesia.  The following day: return to full activity including work.  DIET:  Eat and drink normally unless instructed otherwise.     TREATMENT FOR COMMON SIDE EFFECTS:  - Mild abdominal pain, nausea, belching, bloating or excessive gas:  rest,   eat lightly and use a heating pad.  - Sore Throat: treat with throat lozenges and/or gargle with warm salt   water.  - Because air was used during the procedure, expelling large amounts of air   from your rectum or belching is normal.  - If a bowel prep was taken, you may not have a bowel movement for 1-3 days.    This is normal.  SYMPTOMS TO WATCH FOR AND REPORT TO YOUR PHYSICIAN:  1. Abdominal pain or bloating, other than gas cramps.  2. Chest pain.  3. Back pain.  4. Signs of infection such as: chills or fever occurring within 24 hours   after the procedure.  5. Rectal bleeding, which would show as bright red, maroon, or black stools.   (A tablespoon of blood from the rectum is not serious, especially if   hemorrhoids are present.)  6. Vomiting.  7. Weakness or dizziness.  GO DIRECTLY TO THE NEAREST EMERGENCY ROOM IF YOU HAVE ANY OF THE FOLLOWING:      Difficulty breathing              Chills and/or fever over 101 F   Persistent vomiting and/or vomiting blood   Severe abdominal pain   Severe chest pain   Black, tarry stools   Bleeding- more than one  tablespoon   Any other symptom or condition that you feel may need urgent attention  Your doctor recommends these additional instructions:  If any biopsies were taken, your doctors clinic will contact you in 1 to 2   weeks with any results.  - Await pathology results.   - Discharge patient to home (with escort).   - Resume previous diet.   - Continue present medications.   - The findings and recommendations were discussed with the patient.   - Patient has a contact number available for emergencies.  The signs and   symptoms of potential delayed complications were discussed with the   patient.  Return to normal activities tomorrow.  Written discharge   instructions were provided to the patient.   - Follow up in gi Clinic.   - Start Prilosec (omeprazole) OTC 40 mg PO BID for 1 month.   - Check iron studies, ferritin and CBC.  Order colonoscopy if evidence of   iron deficincy anemia.   For questions, problems or results please call your physician - Haritha Avila MD at Work:  [phone].  [facility], EMERGENCY ROOM PHONE NUMBER: [phone]  IF A COMPLICATION OR EMERGENCY SITUATION ARISES AND YOU ARE UNABLE TO REACH   YOUR PHYSICIAN - GO DIRECTLY TO THE EMERGENCY ROOM.  Haritha Avila MD  10/1/2018 3:27:29 PM  This report has been verified and signed electronically.  [employer]

## 2018-10-01 NOTE — ANESTHESIA POSTPROCEDURE EVALUATION
"Anesthesia Post Evaluation    Patient: Mylene Yoon    Procedure(s) Performed: Procedure(s) (LRB):  ESOPHAGOGASTRODUODENOSCOPY (EGD) (N/A)    Final Anesthesia Type: general  Patient location during evaluation: GI PACU  Patient participation: Yes- Able to Participate  Level of consciousness: awake and alert and oriented  Post-procedure vital signs: reviewed and stable  Pain management: adequate  Airway patency: patent  PONV status at discharge: No PONV  Anesthetic complications: no      Cardiovascular status: blood pressure returned to baseline  Respiratory status: unassisted, spontaneous ventilation and room air  Hydration status: euvolemic  Follow-up not needed.        Visit Vitals  /72 (BP Location: Left arm, Patient Position: Sitting)   Pulse 72   Temp 37.1 °C (98.7 °F) (Oral)   Resp 12   Ht 5' 3" (1.6 m)   Wt 61.2 kg (135 lb)   SpO2 98%   Breastfeeding? No   BMI 23.91 kg/m²       Pain/Radha Score: Pain Assessment Performed: Yes (10/1/2018  3:54 PM)  Presence of Pain: denies (10/1/2018  3:54 PM)  Pain Rating Prior to Med Admin: 0 (10/1/2018  3:54 PM)  Radha Score: 10 (10/1/2018  3:54 PM)        "

## 2018-10-01 NOTE — TRANSFER OF CARE
"Anesthesia Transfer of Care Note    Patient: Mylene Yoon    Procedure(s) Performed: Procedure(s) (LRB):  ESOPHAGOGASTRODUODENOSCOPY (EGD) (N/A)    Patient location: PACU    Anesthesia Type: general    Transport from OR: Transported from OR on room air with adequate spontaneous ventilation    Post pain: adequate analgesia    Post assessment: tolerated procedure well and no apparent anesthetic complications    Post vital signs: stable    Level of consciousness: sedated    Nausea/Vomiting: no nausea/vomiting    Complications: none    Transfer of care protocol was followed      Last vitals:   Visit Vitals  /64 (BP Location: Left arm, Patient Position: Lying)   Pulse 67   Temp 37 °C (98.6 °F) (Temporal)   Resp 14   Ht 5' 3" (1.6 m)   Wt 61.2 kg (135 lb)   SpO2 98%   Breastfeeding? No   BMI 23.91 kg/m²     "

## 2018-10-01 NOTE — ANESTHESIA PREPROCEDURE EVALUATION
10/01/2018  Mylene Yoon is a 28 y.o., female.    Anesthesia Evaluation    I have reviewed the Patient Summary Reports.     I have reviewed the Medications.     Review of Systems  Anesthesia Hx:  No problems with previous Anesthesia Denies Hx of Anesthetic complications  Denies Family Hx of Anesthesia complications.   Denies Personal Hx of Anesthesia complications.   Hematology/Oncology:  Hematology Normal   Oncology Normal     EENT/Dental:EENT/Dental Normal   Cardiovascular:  Cardiovascular Normal     Pulmonary:  Pulmonary Normal    Renal/:  Renal/ Normal     Hepatic/GI:  Hepatic/GI Normal    Musculoskeletal:  Musculoskeletal Normal    Neurological:  Neurology Normal    Endocrine:  Endocrine Normal    Dermatological:  Skin Normal    Psych:  Psychiatric Normal           Physical Exam  General:  Well nourished    Airway/Jaw/Neck:  Airway Findings: Mouth Opening: Normal Tongue: Normal  General Airway Assessment: Adult  Mallampati: II  TM Distance: Normal, at least 6 cm  Jaw/Neck Findings:  Neck ROM: Normal ROM     Eyes/Ears/Nose:  EYES/EARS/NOSE FINDINGS: Normal   Dental:  Dental Findings: In tact   Chest/Lungs:  Chest/Lungs Findings: Clear to auscultation, Normal Respiratory Rate     Heart/Vascular:  Heart Findings: Rate: Normal  Rhythm: Regular Rhythm  Sounds: Normal        Mental Status:  Mental Status Findings:  Cooperative, Alert and Oriented         Anesthesia Plan  Type of Anesthesia, risks & benefits discussed:  Anesthesia Type:  general  Patient's Preference: general  Intra-op Monitoring Plan: standard ASA monitors  Intra-op Monitoring Plan Comments:   Post Op Pain Control Plan: multimodal analgesia  Post Op Pain Control Plan Comments:   Induction:   IV  Beta Blocker:  Patient is not currently on a Beta-Blocker (No further documentation required).       Informed Consent: Patient understands  risks and agrees with Anesthesia plan.  Questions answered. Anesthesia consent signed with patient.  ASA Score: 1     Day of Surgery Review of History & Physical: I have interviewed and examined the patient. I have reviewed the patient's H&P dated: 10/1/2018.   H&P update referred to the provider.         Ready For Surgery From Anesthesia Perspective.

## 2018-10-01 NOTE — H&P
Short Stay Endoscopy History and Physical    PCP - Tima Escobar MD     Procedure - EGD  ASA - per anesthesia  Mallampati - per anesthesia  History of Anesthesia problems - no  Family history Anesthesia problems -  no   Plan of anesthesia - General    HPI:  This is a 28 y.o. female here for evaluation of abd pain, gerd.  CBC w anemia     Dysphagia - no  Diarrhea - no  Melena - no     ROS:  Constitutional: No fevers, chills, No weight loss  CV: No chest pain  Pulm: No cough, No shortness of breath  Ophtho: No vision changes  GI: see HPI  Derm: No rash    Medical History:  has no past medical history on file.    Surgical History:  has no past surgical history on file.    Family History: family history includes Depression in her mother; Hyperlipidemia in her sister.. Otherwise no colon cancer, inflammatory bowel disease, or GI malignancies.    Social History:  reports that she has been smoking.  she has never used smokeless tobacco. She reports that she drinks alcohol. She reports that she does not use drugs.    Review of patient's allergies indicates:  No Known Allergies    Medications:   Medications Prior to Admission   Medication Sig Dispense Refill Last Dose    MICROGESTIN 1.5/30, 21, 1.5-30 mg-mcg Tab    9/30/2018 at Unknown time    NON FORMULARY Birth Control pills.   Taking       Physical Exam:    Vital Signs:   Vitals:    10/01/18 1432   BP: 112/64   Pulse: 67   Resp: 14   Temp: 98.6 °F (37 °C)       General Appearance: Well appearing in no acute distress  Eyes:    No scleral icterus  Lungs: CTA anteriorly  Heart:  Regular rate, S1, S2 normal, no murmurs heard.  Abdomen: Soft, non tender, non distended with normal bowel sounds. No hepatosplenomegaly, ascites, or mass.  Extremities: No edema  Skin: No rash    Labs:  Lab Results   Component Value Date    WBC 5.22 05/25/2018    HGB 11.8 (L) 05/25/2018    HCT 35.9 (L) 05/25/2018     05/25/2018    ALT 17 05/25/2018    AST 21 05/25/2018      05/25/2018    K 3.9 05/25/2018     05/25/2018    CREATININE 0.9 05/25/2018    BUN 11 05/25/2018    CO2 26 05/25/2018       I have explained the risks and benefits of endoscopy procedures to the patient including but not limited to bleeding, perforation, infection, and death.      Haritha Avila MD

## 2018-10-04 NOTE — TELEPHONE ENCOUNTER
Pls let pt know that apt was to address her abd pain and anemia.  She will needs further evaluation of these problems, possibly colonoscopy dep[ending on what the labs will show.

## 2018-10-05 ENCOUNTER — TELEPHONE (OUTPATIENT)
Dept: GASTROENTEROLOGY | Facility: CLINIC | Age: 28
End: 2018-10-05

## 2018-10-05 RX ORDER — AMOXICILLIN 500 MG/1
1000 CAPSULE ORAL 2 TIMES DAILY
Qty: 28 CAPSULE | Refills: 0 | Status: SHIPPED | OUTPATIENT
Start: 2018-10-05 | End: 2018-10-19

## 2018-10-05 RX ORDER — OMEPRAZOLE 40 MG/1
40 CAPSULE, DELAYED RELEASE ORAL
Qty: 28 CAPSULE | Refills: 0 | Status: SHIPPED | OUTPATIENT
Start: 2018-10-05 | End: 2018-10-19

## 2018-10-05 RX ORDER — CLARITHROMYCIN 500 MG/1
500 TABLET, FILM COATED ORAL 2 TIMES DAILY
Qty: 28 TABLET | Refills: 0 | Status: SHIPPED | OUTPATIENT
Start: 2018-10-05 | End: 2018-10-19

## 2018-10-05 NOTE — TELEPHONE ENCOUNTER
"PATH:    Please let the pt know that pathology from recent procedure shows:    H. Pylori infection     No other significant abnormality     H. pylori infection occurs when a type of bacteria called "H. pylori" infects a person's stomach.  Many people have H. pylori infection. Most of the time, H. pylori infection does not lead to any problems or cause any symptoms. But in some people, H. pylori infection leads to problems that can cause symptoms. These problems can include: ulcers (open sores on the lining of a person's stomach or small intestine) and stomach cancer.  These conditions can sometimes cause pain in the upper belly, bloating, nausea, or vomiting. Doctors do not know why H. pylori infection leads to problems in some people and not others.      It is important that we eliminate this infection  Please make sure that pt was not previously treated for H. Pylori infection.  If no previous treatment,  and I would like to start the following treatment for 14 days:  Omeprazole 40 mg twice daily  Clarithromycin 500 mg twice daily  Amoxicillin 1 g twice daily  Metronidazole 500 mg twice daily (can be substituted for amoxicillin in penicillin-allergic individuals.     We will need to check stool H. Pylori to assure the infection has resolved.       Should follow up with referring provider - Darrell Fitzgerald MD     Should follow up with IAN Baires NP next available - pls arrange     Dr. Avila               "

## 2018-10-06 ENCOUNTER — DOCUMENTATION ONLY (OUTPATIENT)
Dept: BARIATRICS | Facility: CLINIC | Age: 28
End: 2018-10-06

## 2018-10-06 NOTE — PROGRESS NOTES
egd with positive hpylori  U/s negative (ct in May showed thickened gallbladder, fluid and portal inflammation)  LFTs negative    Will see how she is in a few weeks after hpyori treatment and consider cckhida and repeat ct.

## 2018-10-08 ENCOUNTER — TELEPHONE (OUTPATIENT)
Dept: GASTROENTEROLOGY | Facility: CLINIC | Age: 28
End: 2018-10-08

## 2018-10-08 ENCOUNTER — TELEPHONE (OUTPATIENT)
Dept: ENDOSCOPY | Facility: HOSPITAL | Age: 28
End: 2018-10-08

## 2018-10-10 ENCOUNTER — TELEPHONE (OUTPATIENT)
Dept: SURGERY | Facility: CLINIC | Age: 28
End: 2018-10-10

## 2024-05-30 NOTE — TELEPHONE ENCOUNTER
Katrina Gardner is a 8 day old female who was brought in for this visit.  History was provided by the mother  HPI:     Chief Complaint   Patient presents with    Other     Bili check     Taking pumped milk from the bottle well  Having more than 6 voids and yellow seedy stools a day  TSB was still rising 2 days ago and was up to 14.4 with a LL of 18.2  Is jamey positive    Current Medications  No current outpatient medications on file.    Allergies  No Known Allergies        PHYSICAL EXAM:   Ht 19.5\"   Wt 3.53 kg (7 lb 12.5 oz)   HC 35 cm   BMI 14.39 kg/m²     Constitutional: well-hydrated, alert and responsive, no acute distress noted  Nose/Throat: nasal mucosa normal, oropharynx clear without lesions, mucous membranes moist  Neck/Thyroid: neck is supple without adenopathy  Respiratory: normal to inspection, lungs are clear to auscultation bilaterally, normal respiratory effort  Cardiovascular: regular rate and rhythm, no murmurs  Abdomen: soft, non-tender, non-distended, no organomegaly, no masses  Skin: moderate jaundice        ASSESSMENT/PLAN:   Diagnoses and all orders for this visit:     hyperbilirubinemia  -     Bilirubin, Total; Future    ABO incompatibility affecting  (HCC)  -     Bilirubin, Total; Future      Repeat TSB now  Feeding well and gaining weight  If bili stable, return for 2 week well visit    Patient/parent questions answered and states understanding of instructions  Reviewed return precautions.    Results From Past 48 Hours:  Recent Results (from the past 48 hour(s))   Bilirubin, Total/Direct, Serum    Collection Time: 24 11:45 AM   Result Value Ref Range    Bilirubin, Direct 0.5 (H) <=0.3 mg/dL    Bilirubin, Total 14.4 (H) <11.0 mg/dL       Orders Placed This Visit:  Orders Placed This Encounter   Procedures    Bilirubin, Total       No follow-ups on file.      2024  Dana Armstrong MD       Pt wants to know why she needs to fu? She does not want to come if it is not a necessity.

## 2025-03-25 ENCOUNTER — LAB VISIT (OUTPATIENT)
Dept: LAB | Facility: HOSPITAL | Age: 35
End: 2025-03-25
Attending: NURSE PRACTITIONER
Payer: COMMERCIAL

## 2025-03-25 ENCOUNTER — OFFICE VISIT (OUTPATIENT)
Dept: PRIMARY CARE CLINIC | Facility: CLINIC | Age: 35
End: 2025-03-25
Payer: COMMERCIAL

## 2025-03-25 VITALS
OXYGEN SATURATION: 99 % | BODY MASS INDEX: 23.68 KG/M2 | HEART RATE: 81 BPM | WEIGHT: 133.63 LBS | DIASTOLIC BLOOD PRESSURE: 68 MMHG | SYSTOLIC BLOOD PRESSURE: 100 MMHG | RESPIRATION RATE: 16 BRPM | HEIGHT: 63 IN

## 2025-03-25 DIAGNOSIS — Z13.6 ENCOUNTER FOR LIPID SCREENING FOR CARDIOVASCULAR DISEASE: ICD-10-CM

## 2025-03-25 DIAGNOSIS — Z00.00 ENCOUNTER FOR MEDICAL EXAMINATION TO ESTABLISH CARE: Primary | ICD-10-CM

## 2025-03-25 DIAGNOSIS — Z02.0 SCHOOL PHYSICAL EXAM: ICD-10-CM

## 2025-03-25 DIAGNOSIS — Z23 NEED FOR VACCINATION: ICD-10-CM

## 2025-03-25 DIAGNOSIS — Z13.220 ENCOUNTER FOR LIPID SCREENING FOR CARDIOVASCULAR DISEASE: ICD-10-CM

## 2025-03-25 DIAGNOSIS — Z00.00 ENCOUNTER FOR MEDICAL EXAMINATION TO ESTABLISH CARE: ICD-10-CM

## 2025-03-25 DIAGNOSIS — Z13.1 SCREENING FOR DIABETES MELLITUS: ICD-10-CM

## 2025-03-25 DIAGNOSIS — Z12.4 SCREENING FOR CERVICAL CANCER: ICD-10-CM

## 2025-03-25 DIAGNOSIS — N30.00 ACUTE CYSTITIS WITHOUT HEMATURIA: ICD-10-CM

## 2025-03-25 LAB
ABSOLUTE EOSINOPHIL (OHS): 0.08 K/UL
ABSOLUTE MONOCYTE (OHS): 0.41 K/UL (ref 0.3–1)
ABSOLUTE NEUTROPHIL COUNT (OHS): 3.71 K/UL (ref 1.8–7.7)
ALBUMIN SERPL BCP-MCNC: 4.2 G/DL (ref 3.5–5.2)
ALP SERPL-CCNC: 25 UNIT/L (ref 40–150)
ALT SERPL W/O P-5'-P-CCNC: 12 UNIT/L (ref 10–44)
ANION GAP (OHS): 8 MMOL/L (ref 8–16)
AST SERPL-CCNC: 18 UNIT/L (ref 11–45)
BASOPHILS # BLD AUTO: 0.04 K/UL
BASOPHILS NFR BLD AUTO: 0.6 %
BILIRUB SERPL-MCNC: 0.8 MG/DL (ref 0.1–1)
BUN SERPL-MCNC: 7 MG/DL (ref 6–20)
CALCIUM SERPL-MCNC: 9.5 MG/DL (ref 8.7–10.5)
CHLORIDE SERPL-SCNC: 103 MMOL/L (ref 95–110)
CHOLEST SERPL-MCNC: 152 MG/DL (ref 120–199)
CHOLEST/HDLC SERPL: 1.9 {RATIO} (ref 2–5)
CO2 SERPL-SCNC: 23 MMOL/L (ref 23–29)
CREAT SERPL-MCNC: 1 MG/DL (ref 0.5–1.4)
EAG (OHS): 97 MG/DL (ref 68–131)
ERYTHROCYTE [DISTWIDTH] IN BLOOD BY AUTOMATED COUNT: 12.4 % (ref 11.5–14.5)
GFR SERPLBLD CREATININE-BSD FMLA CKD-EPI: >60 ML/MIN/1.73/M2
GLUCOSE SERPL-MCNC: 88 MG/DL (ref 70–110)
HBA1C MFR BLD: 5 % (ref 4–5.6)
HCT VFR BLD AUTO: 38 % (ref 37–48.5)
HDLC SERPL-MCNC: 80 MG/DL (ref 40–75)
HDLC SERPL: 52.6 % (ref 20–50)
HGB BLD-MCNC: 12.4 GM/DL (ref 12–16)
IMM GRANULOCYTES # BLD AUTO: 0.01 K/UL (ref 0–0.04)
IMM GRANULOCYTES NFR BLD AUTO: 0.2 % (ref 0–0.5)
LDLC SERPL CALC-MCNC: 64.8 MG/DL (ref 63–159)
LYMPHOCYTES # BLD AUTO: 2.28 K/UL (ref 1–4.8)
MCH RBC QN AUTO: 29.8 PG (ref 27–50)
MCHC RBC AUTO-ENTMCNC: 32.6 G/DL (ref 32–36)
MCV RBC AUTO: 91 FL (ref 82–98)
NONHDLC SERPL-MCNC: 72 MG/DL
NUCLEATED RBC (/100WBC) (OHS): 0 /100 WBC
PLATELET # BLD AUTO: 348 K/UL (ref 150–450)
PMV BLD AUTO: 10.1 FL (ref 9.2–12.9)
POTASSIUM SERPL-SCNC: 4.2 MMOL/L (ref 3.5–5.1)
PROT SERPL-MCNC: 7.7 GM/DL (ref 6–8.4)
RBC # BLD AUTO: 4.16 M/UL (ref 4–5.4)
RELATIVE EOSINOPHIL (OHS): 1.2 %
RELATIVE LYMPHOCYTE (OHS): 34.9 % (ref 18–48)
RELATIVE MONOCYTE (OHS): 6.3 % (ref 4–15)
RELATIVE NEUTROPHIL (OHS): 56.8 % (ref 38–73)
SODIUM SERPL-SCNC: 134 MMOL/L (ref 136–145)
T4 FREE SERPL-MCNC: 0.99 NG/DL (ref 0.71–1.51)
TRIGL SERPL-MCNC: 36 MG/DL (ref 30–150)
TSH SERPL-ACNC: 2.59 UIU/ML (ref 0.4–4)
WBC # BLD AUTO: 6.53 K/UL (ref 3.9–12.7)

## 2025-03-25 PROCEDURE — 80061 LIPID PANEL: CPT

## 2025-03-25 PROCEDURE — 86735 MUMPS ANTIBODY: CPT

## 2025-03-25 PROCEDURE — 99999 PR PBB SHADOW E&M-NEW PATIENT-LVL III: CPT | Mod: PBBFAC,,, | Performed by: NURSE PRACTITIONER

## 2025-03-25 PROCEDURE — 82040 ASSAY OF SERUM ALBUMIN: CPT

## 2025-03-25 PROCEDURE — 84439 ASSAY OF FREE THYROXINE: CPT

## 2025-03-25 PROCEDURE — 86706 HEP B SURFACE ANTIBODY: CPT

## 2025-03-25 PROCEDURE — 86765 RUBEOLA ANTIBODY: CPT

## 2025-03-25 PROCEDURE — 84443 ASSAY THYROID STIM HORMONE: CPT

## 2025-03-25 PROCEDURE — 3074F SYST BP LT 130 MM HG: CPT | Mod: CPTII,S$GLB,, | Performed by: NURSE PRACTITIONER

## 2025-03-25 PROCEDURE — 85025 COMPLETE CBC W/AUTO DIFF WBC: CPT

## 2025-03-25 PROCEDURE — 3078F DIAST BP <80 MM HG: CPT | Mod: CPTII,S$GLB,, | Performed by: NURSE PRACTITIONER

## 2025-03-25 PROCEDURE — 36415 COLL VENOUS BLD VENIPUNCTURE: CPT

## 2025-03-25 PROCEDURE — 86762 RUBELLA ANTIBODY: CPT

## 2025-03-25 PROCEDURE — 99385 PREV VISIT NEW AGE 18-39: CPT | Mod: S$GLB,,, | Performed by: NURSE PRACTITIONER

## 2025-03-25 PROCEDURE — 1160F RVW MEDS BY RX/DR IN RCRD: CPT | Mod: CPTII,S$GLB,, | Performed by: NURSE PRACTITIONER

## 2025-03-25 PROCEDURE — 1159F MED LIST DOCD IN RCRD: CPT | Mod: CPTII,S$GLB,, | Performed by: NURSE PRACTITIONER

## 2025-03-25 PROCEDURE — 86787 VARICELLA-ZOSTER ANTIBODY: CPT

## 2025-03-25 PROCEDURE — 83036 HEMOGLOBIN GLYCOSYLATED A1C: CPT

## 2025-03-25 PROCEDURE — 3008F BODY MASS INDEX DOCD: CPT | Mod: CPTII,S$GLB,, | Performed by: NURSE PRACTITIONER

## 2025-03-25 RX ORDER — PHENAZOPYRIDINE HYDROCHLORIDE 200 MG/1
200 TABLET, FILM COATED ORAL 3 TIMES DAILY PRN
Qty: 10 TABLET | Refills: 0 | Status: SHIPPED | OUTPATIENT
Start: 2025-03-25 | End: 2025-03-28

## 2025-03-25 NOTE — PROGRESS NOTES
[facility] Note    Chief Complaint      Chief Complaint   Patient presents with    Establish Care     History of Present Illness      Mylene Yoon is a 34 y.o. 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    IMMUNIZATION STATUS:  She received tetanus vaccination in 2013 and is up to date on Hepatitis vaccinations. She has missing MMR and varicella vaccination records and has not received current flu vaccine.    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 She is currently taking Bactrim for suspected bladder infection.    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 She reports limited exercise for the past year and a half due to busy schedule with work and children.      ROS:  Genitourinary: +dysuria, +difficulty urinating, +painful urination, +bladder pain         Health Maintenance   Topic Date Due    Hepatitis C Screening  Never done    Cervical Cancer Screening  Never done    Lipid Panel  Never done    HIV Screening  Never done    Pneumococcal Vaccines (Age 0-49) (1 of 2 - PCV) Never done    TETANUS VACCINE  10/30/2023    Influenza Vaccine (1) 09/01/2024    COVID-19 Vaccine (2 - 2024-25 season) 09/01/2024    RSV Vaccine (Age 60+ and Pregnant patients) (1 - 1-dose 75+ series) 09/21/2063       No past medical history on file.    Past Surgical History:   Procedure Laterality Date    ESOPHAGOGASTRODUODENOSCOPY N/A 10/1/2018    Procedure: ESOPHAGOGASTRODUODENOSCOPY (EGD);  Surgeon: Haritha Avila MD;  Location: [address]);  Service: Endoscopy;  Laterality: N/A;       family history  "includes Depression in her mother; Hyperlipidemia in her sister.    Social History[1]    Encounter Medications[2]     Review of patient's allergies indicates:  No Known Allergies    Physical Exam      Vital Signs  Pulse: 81  Resp: 16  SpO2: 99 %  BP: 100/68  BP Location: Right arm  Patient Position: Sitting  Height and Weight  Height: 5' 3" (160 cm)  Weight: 60.6 kg (133 lb 9.6 oz)  BSA (Calculated - sq m): 1.64 sq meters  BMI (Calculated): 23.7  Weight in (lb) to have BMI = 25: 140.8    Physical Exam  Vitals and nursing note reviewed.   Constitutional:       General: She is not in acute distress.     Appearance: Normal appearance. She is well-developed. She is not ill-appearing.   HENT:      Head: Normocephalic and atraumatic.      Right Ear: External ear normal.      Left Ear: External ear normal.   Eyes:      Conjunctiva/sclera: Conjunctivae normal.      Pupils: Pupils are equal, round, and reactive to light.   Neck:      Thyroid: No thyromegaly.      Vascular: No JVD.      Trachea: No tracheal deviation.   Cardiovascular:      Rate and Rhythm: Normal rate and regular rhythm.      Heart sounds: Normal heart sounds. No murmur heard.  Pulmonary:      Effort: Pulmonary effort is normal.      Breath sounds: Normal breath sounds.   Chest:      Chest wall: No tenderness.   Abdominal:      General: Bowel sounds are normal.      Palpations: Abdomen is soft.      Tenderness: There is no abdominal tenderness. There is no guarding.   Musculoskeletal:         General: Normal range of motion.      Cervical back: Normal range of motion and neck supple.   Lymphadenopathy:      Cervical: No cervical adenopathy.   Skin:     General: Skin is warm and dry.   Neurological:      General: No focal deficit present.      Mental Status: She is alert and oriented to person, place, and time.   Psychiatric:         Mood and Affect: Mood normal.         Behavior: Behavior normal.         Thought Content: Thought content normal.         " "Judgment: Judgment normal.          Laboratory:  CBC:  Lab Results   Component Value Date    WBC 5.22 05/25/2018    RBC 3.95 (L) 05/25/2018    HGB 11.8 (L) 05/25/2018    HCT 35.9 (L) 05/25/2018     05/25/2018    MCV 91 05/25/2018    MCH 29.9 05/25/2018    MCHC 32.9 05/25/2018    MCHC 33.7 01/26/2015     CMP:  Lab Results   Component Value Date     05/25/2018    CALCIUM 9.4 05/25/2018    ALBUMIN 3.8 05/25/2018    PROT 7.2 05/25/2018     05/25/2018    K 3.9 05/25/2018    CO2 26 05/25/2018     05/25/2018    BUN 11 05/25/2018    ALKPHOS 24 (L) 05/25/2018    ALT 17 05/25/2018    AST 21 05/25/2018    BILITOT 0.4 05/25/2018    BILITOT 0.4 01/26/2015     URINALYSIS:  Lab Results   Component Value Date    COLORU Yellow 05/25/2018    SPECGRAV 1.020 05/25/2018    PHUR 7.0 05/25/2018    PROTEINUA Negative 05/25/2018    BACTERIA Rare 11/10/2005    NITRITE Negative 05/25/2018    LEUKOCYTESUR Negative 05/25/2018    UROBILINOGEN Negative 05/25/2018      LIPIDS:  No results found for: "TSH", "HDL", "CHOL", "TRIG", "LDLCALC", "CHOLHDL", "NONHDLCHOL", "TOTALCHOLEST"  TSH:  No results found for: "TSH"  A1C:  No results found for: "HGBA1C"      Assessment/Plan     Mylene GOGO Yoon is a 34 y.o.female with:    Assessment & Plan    R33.8 Other retention of urine  N39.0 Urinary tract infection, site not specified  Z28.39 Other underimmunization status    IMPRESSION:  - Reviewed immunization history and determined need for titers due to incomplete records.  - Considered recent urinary symptoms and history of bladder infections.  - Evaluated need for tetanus booster given last dose in 2013.    URINARY RETENTION:  - Mylene reports holding urine due to operating room work habits.  - Acknowledged this issue should be addressed.    URINARY TRACT INFECTION:  - Mylene reports feeling like a urinary tract infection is developing.  - Advised the patient to increase fluid intake to prevent urinary tract infection.  - " Mylene is currently taking sulfamethoxazole/trimethoprim for urinary tract infection prevention/treatment.  - Offered to prescribe additional medication for urinary tract infection symptoms.  - Noted the patient's previous treatment with sulfamethoxazole/trimethoprim and an unspecified orange pill for urinary tract infection symptoms.    IMMUNIZATION STATUS:  - Ordered titers for measles, mumps, rubella, varicella, and hepatitis B to check immunization status.  - Administered tetanus booster vaccination today.  - Unable to locate complete immunization records in the system.  - Noted the patient's last tetanus vaccine was in 2013.  - Recommend influenza vaccination.    GENERAL HEALTH ASSESSMENT:  - Ordered comprehensive lab panel including complete blood count, metabolic panel, and lipid profile.  - Referred the patient to in-house gynecologist.         Encounter for medical examination to establish care  -     CBC Auto Differential; Future; Expected date: 03/25/2025  -     Comprehensive Metabolic Panel; Future; Expected date: 03/25/2025  -     Hemoglobin A1C; Future; Expected date: 03/25/2025  -     TSH; Future; Expected date: 03/25/2025  -     T4, Free; Future; Expected date: 03/25/2025  -     Lipid Panel; Future; Expected date: 03/25/2025  -     Rubella antibody, IgG; Future; Expected date: 03/25/2025  -     Rubeola antibody IgG; Future; Expected date: 03/25/2025  -     Mumps, IgG Screen; Future; Expected date: 03/25/2025  -     Hepatitis B Surface Antibody, Qual/Quant; Future; Expected date: 03/25/2025  -     Varicella zoster antibody, IgG; Future; Expected date: 03/25/2025    School physical exam  -     CBC Auto Differential; Future; Expected date: 03/25/2025  -     Comprehensive Metabolic Panel; Future; Expected date: 03/25/2025  -     Hemoglobin A1C; Future; Expected date: 03/25/2025  -     TSH; Future; Expected date: 03/25/2025  -     T4, Free; Future; Expected date: 03/25/2025  -     Lipid Panel; Future;  Expected date: 03/25/2025  -     Rubella antibody, IgG; Future; Expected date: 03/25/2025  -     Rubeola antibody IgG; Future; Expected date: 03/25/2025  -     Mumps, IgG Screen; Future; Expected date: 03/25/2025  -     Hepatitis B Surface Antibody, Qual/Quant; Future; Expected date: 03/25/2025  -     Varicella zoster antibody, IgG; Future; Expected date: 03/25/2025    Screening for diabetes mellitus  -     CBC Auto Differential; Future; Expected date: 03/25/2025  -     Comprehensive Metabolic Panel; Future; Expected date: 03/25/2025  -     Hemoglobin A1C; Future; Expected date: 03/25/2025  -     TSH; Future; Expected date: 03/25/2025  -     T4, Free; Future; Expected date: 03/25/2025  -     Lipid Panel; Future; Expected date: 03/25/2025  -     Rubella antibody, IgG; Future; Expected date: 03/25/2025  -     Rubeola antibody IgG; Future; Expected date: 03/25/2025  -     Mumps, IgG Screen; Future; Expected date: 03/25/2025  -     Hepatitis B Surface Antibody, Qual/Quant; Future; Expected date: 03/25/2025  -     Varicella zoster antibody, IgG; Future; Expected date: 03/25/2025    Encounter for lipid screening for cardiovascular disease  -     CBC Auto Differential; Future; Expected date: 03/25/2025  -     Comprehensive Metabolic Panel; Future; Expected date: 03/25/2025  -     Hemoglobin A1C; Future; Expected date: 03/25/2025  -     TSH; Future; Expected date: 03/25/2025  -     T4, Free; Future; Expected date: 03/25/2025  -     Lipid Panel; Future; Expected date: 03/25/2025  -     Rubella antibody, IgG; Future; Expected date: 03/25/2025  -     Rubeola antibody IgG; Future; Expected date: 03/25/2025  -     Mumps, IgG Screen; Future; Expected date: 03/25/2025  -     Hepatitis B Surface Antibody, Qual/Quant; Future; Expected date: 03/25/2025  -     Varicella zoster antibody, IgG; Future; Expected date: 03/25/2025    Screening for cervical cancer  -     Ambulatory referral/consult to Gynecology; Future; Expected date:  03/25/2025    Need for vaccination  -     CBC Auto Differential; Future; Expected date: 03/25/2025  -     Comprehensive Metabolic Panel; Future; Expected date: 03/25/2025  -     Hemoglobin A1C; Future; Expected date: 03/25/2025  -     TSH; Future; Expected date: 03/25/2025  -     T4, Free; Future; Expected date: 03/25/2025  -     Lipid Panel; Future; Expected date: 03/25/2025  -     Rubella antibody, IgG; Future; Expected date: 03/25/2025  -     Rubeola antibody IgG; Future; Expected date: 03/25/2025  -     Mumps, IgG Screen; Future; Expected date: 03/25/2025  -     Hepatitis B Surface Antibody, Qual/Quant; Future; Expected date: 03/25/2025  -     Varicella zoster antibody, IgG; Future; Expected date: 03/25/2025          Health Maintenance Due   Topic Date Due    Hepatitis C Screening  Never done    Cervical Cancer Screening  Never done    Lipid Panel  Never done    HIV Screening  Never done    Pneumococcal Vaccines (Age 0-49) (1 of 2 - PCV) Never done    TETANUS VACCINE  10/30/2023    Influenza Vaccine (1) 09/01/2024    COVID-19 Vaccine (2 - 2024-25 season) 09/01/2024        I spent 36 minutes on the day of this encounter for preparing for, evaluating, treating, and managing this patient.      -Continue current medications and maintain follow up with specialists.  Return to clinic in in 1 year sooner for any concerns No follow-ups on file.     This note was generated with the assistance of ambient listening technology. Verbal consent was obtained by the patient and accompanying visitor(s) for the recording of patient appointment to facilitate this note. I attest to having reviewed and edited the generated note for accuracy, though some syntax or spelling errors may persist. Please contact the author of this note for any clarification.        DELVIN Falcon  [facility] location                       [1]   Social History  Tobacco Use    Smoking status: Light Smoker     Passive exposure: Never     Smokeless tobacco: Never   Substance Use Topics    Alcohol use: Yes    Drug use: No   [2]   Outpatient Encounter Medications as of 3/25/2025   Medication Sig Dispense Refill    levonorgestreL (MIRENA) 52 mg IUD 1 each by Intrauterine route once.      omeprazole (PRILOSEC) 40 MG capsule Take 1 capsule (40 mg total) by mouth 2 (two) times daily before meals. for 14 days 28 capsule 0    [DISCONTINUED] MICROGESTIN 1.5/30, 21, 1.5-30 mg-mcg Tab  (Patient not taking: Reported on 3/25/2025)      [DISCONTINUED] NON FORMULARY Birth Control pills. (Patient not taking: Reported on 3/25/2025)       No facility-administered encounter medications on file as of 3/25/2025.

## 2025-03-26 ENCOUNTER — TELEPHONE (OUTPATIENT)
Dept: PRIMARY CARE CLINIC | Facility: CLINIC | Age: 35
End: 2025-03-26
Payer: COMMERCIAL

## 2025-03-26 LAB
MUMPS IGG (OHS): 60.1 AU/ML
MUMPS IGG INTERPRETATION (OHS): POSITIVE
RUBEOLA (MEASLES) IGG (OHS): <5 AU/ML
RUBEOLA IGG INTERP. (OHS): NEGATIVE
RUBV IGG SER-ACNC: 20.9 IU/ML
RUBV IGG SER-IMP: REACTIVE
V.ZOSTER IGG INTERP (OHS): POSITIVE
VARICELLA ZOSTER IGG (OHS): 7.43 S/CO

## 2025-03-26 NOTE — TELEPHONE ENCOUNTER
----- Message from Yeni sent at 3/26/2025 10:12 AM CDT -----  Contact: [phone] .[email or web address] Patient is calling for Medical Advice regarding:pt is asking for a callback she has questions about blood work and what shot she needs to have done. Best available time to call her back is after 3pm Please call pt back and advise.How long has patient had these symptoms:Pharmacy name and phone#:Patient wants a call back or thru [facility]:callbackcallbackComments:Please advise patient replies from provider may take up to 48 hours.

## 2025-03-27 ENCOUNTER — RESULTS FOLLOW-UP (OUTPATIENT)
Dept: PRIMARY CARE CLINIC | Facility: CLINIC | Age: 35
End: 2025-03-27

## 2025-03-27 DIAGNOSIS — Z23 NEED FOR VACCINATION: Primary | ICD-10-CM

## 2025-04-21 ENCOUNTER — PATIENT MESSAGE (OUTPATIENT)
Dept: ADMINISTRATIVE | Facility: HOSPITAL | Age: 35
End: 2025-04-21
Payer: COMMERCIAL

## 2025-05-13 ENCOUNTER — PATIENT OUTREACH (OUTPATIENT)
Dept: ADMINISTRATIVE | Facility: HOSPITAL | Age: 35
End: 2025-05-13
Payer: COMMERCIAL

## 2025-05-13 NOTE — PROGRESS NOTES
Chart reviewed, immunizations reconciled, Care Everywhere updated.  Joseph Carmichael LPN  Care Coordinator  [facility]

## 2025-06-16 ENCOUNTER — HOSPITAL ENCOUNTER (EMERGENCY)
Facility: HOSPITAL | Age: 35
Discharge: HOME | End: 2025-06-16
Attending: EMERGENCY MEDICINE
Payer: COMMERCIAL

## 2025-06-16 VITALS
BODY MASS INDEX: 23.04 KG/M2 | TEMPERATURE: 98 F | WEIGHT: 130 LBS | HEART RATE: 72 BPM | OXYGEN SATURATION: 100 % | DIASTOLIC BLOOD PRESSURE: 38 MMHG | SYSTOLIC BLOOD PRESSURE: 110 MMHG | HEIGHT: 63 IN | RESPIRATION RATE: 16 BRPM

## 2025-06-16 DIAGNOSIS — M54.50 ACUTE LOW BACK PAIN WITHOUT SCIATICA, UNSPECIFIED BACK PAIN LATERALITY: ICD-10-CM

## 2025-06-16 DIAGNOSIS — S00.83XA CONTUSION OF CHIN, INITIAL ENCOUNTER: ICD-10-CM

## 2025-06-16 DIAGNOSIS — R68.84 JAW PAIN: ICD-10-CM
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
## 2025-06-16 LAB
B-HCG UR QL: NEGATIVE
BUN SERPL-MCNC: 11 MG/DL (ref 6–30)
BUN SERPL-MCNC: 8 MG/DL (ref 6–30)
CHLORIDE SERPL-SCNC: 103 MMOL/L (ref 95–110)
CHLORIDE SERPL-SCNC: 105 MMOL/L (ref 95–110)
CREAT SERPL-MCNC: 0.8 MG/DL (ref 0.5–1.4)
CREAT SERPL-MCNC: 0.8 MG/DL (ref 0.5–1.4)
CTP QC/QA: YES
GLUCOSE SERPL-MCNC: 100 MG/DL (ref 70–110)
GLUCOSE SERPL-MCNC: 90 MG/DL (ref 70–110)
HCT VFR BLD CALC: 37 %PCV (ref 36–54)
HCT VFR BLD CALC: 41 %PCV (ref 36–54)
POC IONIZED CALCIUM: 1.06 MMOL/L (ref 1.06–1.42)
POC IONIZED CALCIUM: 1.06 MMOL/L (ref 1.06–1.42)
POC TCO2 (MEASURED): 21 MMOL/L (ref 23–29)
POC TCO2 (MEASURED): 24 MMOL/L (ref 23–29)
POTASSIUM BLD-SCNC: 4.8 MMOL/L (ref 3.5–5.1)
POTASSIUM BLD-SCNC: 6.8 MMOL/L (ref 3.5–5.1)
SAMPLE: ABNORMAL
SAMPLE: ABNORMAL
SODIUM BLD-SCNC: 134 MMOL/L (ref 136–145)
SODIUM BLD-SCNC: 135 MMOL/L (ref 136–145)

## 2025-06-16 PROCEDURE — 99285 EMERGENCY DEPT VISIT HI MDM: CPT | Mod: 25

## 2025-06-16 PROCEDURE — 63600175 PHARM REV CODE 636 W HCPCS: Mod: JZ,TB | Performed by: PHYSICIAN ASSISTANT

## 2025-06-16 PROCEDURE — 25000003 PHARM REV CODE 250: Performed by: PHYSICIAN ASSISTANT

## 2025-06-16 PROCEDURE — 96374 THER/PROPH/DIAG INJ IV PUSH: CPT

## 2025-06-16 PROCEDURE — 80047 BASIC METABLC PNL IONIZED CA: CPT

## 2025-06-16 PROCEDURE — 81025 URINE PREGNANCY TEST: CPT | Performed by: EMERGENCY MEDICINE

## 2025-06-16 RX ORDER — KETOROLAC TROMETHAMINE 30 MG/ML
10 INJECTION, SOLUTION INTRAMUSCULAR; INTRAVENOUS
Status: DISCONTINUED | OUTPATIENT
Start: 2025-06-16 | End: 2025-06-16

## 2025-06-16 RX ORDER — METHOCARBAMOL 500 MG/1
1000 TABLET, FILM COATED ORAL
Status: COMPLETED | OUTPATIENT
Start: 2025-06-16 | End: 2025-06-16

## 2025-06-16 RX ORDER — HYDROCODONE BITARTRATE AND ACETAMINOPHEN 5; 325 MG/1; MG/1
1 TABLET ORAL
Refills: 0 | Status: COMPLETED | OUTPATIENT
Start: 2025-06-16 | End: 2025-06-16

## 2025-06-16 RX ORDER — NAPROXEN 500 MG/1
500 TABLET ORAL 2 TIMES DAILY WITH MEALS
Qty: 14 TABLET | Refills: 0 | Status: SHIPPED | OUTPATIENT
Start: 2025-06-16 | End: 2025-06-23

## 2025-06-16 RX ORDER — LIDOCAINE 50 MG/G
1 PATCH TOPICAL
Status: DISCONTINUED | OUTPATIENT
Start: 2025-06-16 | End: 2025-06-16 | Stop reason: HOSPADM

## 2025-06-16 RX ORDER — LIDOCAINE 50 MG/G
1 PATCH TOPICAL DAILY
Qty: 30 PATCH | Refills: 0 | Status: SHIPPED | OUTPATIENT
Start: 2025-06-16 | End: 2025-07-16

## 2025-06-16 RX ORDER — KETOROLAC TROMETHAMINE 30 MG/ML
10 INJECTION, SOLUTION INTRAMUSCULAR; INTRAVENOUS
Status: COMPLETED | OUTPATIENT
Start: 2025-06-16 | End: 2025-06-16

## 2025-06-16 RX ORDER — METHOCARBAMOL 500 MG/1
1000 TABLET, FILM COATED ORAL 3 TIMES DAILY PRN
Qty: 30 TABLET | Refills: 0 | Status: SHIPPED | OUTPATIENT
Start: 2025-06-16 | End: 2025-06-21

## 2025-06-16 RX ADMIN — METHOCARBAMOL 1000 MG: 500 TABLET ORAL at 07:06

## 2025-06-16 RX ADMIN — HYDROCODONE BITARTRATE AND ACETAMINOPHEN 1 TABLET: 5; 325 TABLET ORAL at 07:06

## 2025-06-16 RX ADMIN — KETOROLAC TROMETHAMINE 10 MG: 30 INJECTION, SOLUTION INTRAMUSCULAR; INTRAVENOUS at 07:06

## 2025-06-16 RX ADMIN — LIDOCAINE 1 PATCH: 50 PATCH CUTANEOUS at 07:06

## 2025-06-16 NOTE — Clinical Note
"Mylene Gonzalezboyd Yoon was seen and treated in our emergency department on 6/16/2025.  She may return to work on 06/19/2025.       If you have any questions or concerns, please don't hesitate to call.      Connie Loo PA-C"
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
## 2025-06-17 NOTE — DISCHARGE INSTRUCTIONS
Call your primary care provider as soon as possible to schedule a follow-up appointment in the next 2 to 7 days. Take the prescribed Naproxen and Robaxin as instructed as needed for pain relief. You may need to take these medications consistently for at least 3 to 4 days before you notice any relief. Use the lidocaine patches exactly as instructed as needed for pain relief. Place a single lidocaine patch on the most painful muscle area and leave it on for up to 12 hours (do not leave the patch on for longer than 12 hours). Do not use more than 1 lidocaine patch per day. You should expect to be sore the next few days. Return to the ER with any worsening or concerning symptoms including, but not limited to, worsening or uncontrollable pain, worsening or uncontrollable headache, worsening or new changes in your vision, numbness/tingling or weakness on one side of your body or in your arms or legs, dizziness, uncontrollable vomiting (i.e., inability to drink liquids without vomiting), numbness in your groin, urinary retention (inability to urinate), urinary incontinence (peeing on yourself) without feeling it, or any additional symptom you believe warrants emergency evaluation.

## 2025-08-12 ENCOUNTER — PATIENT MESSAGE (OUTPATIENT)
Dept: PRIMARY CARE CLINIC | Facility: CLINIC | Age: 35
End: 2025-08-12
Payer: COMMERCIAL

## 2025-08-12 ENCOUNTER — TELEPHONE (OUTPATIENT)
Dept: PRIMARY CARE CLINIC | Facility: CLINIC | Age: 35
End: 2025-08-12
Payer: COMMERCIAL